# Patient Record
(demographics unavailable — no encounter records)

---

## 2017-01-10 NOTE — EDDOCDS
Nurse's Notes                                                                                     

[facility]                                                                         

Name: Madyson Snider                                                                                 

Age: 54 yrs                                                                                       

Sex: Female                                                                                       

: 1962                                                                                   

MRN: [id number]                                                                                     

Arrival Date: 01/10/2017                                                                          

Time: 14:44                                                                                       

Account#: [id number]                                                                              

Bed I2 / M2                                                                                       

Private MD: No Pcp                                                                                

Diagnosis: Periapical abscess without sinus-phlegmon left mandible, widespread dental caries      

                                                                                                  

Presentation:                                                                                     

01/10                                                                                             

14:48 Presenting complaint: Patient states: dental infection and has been ongoing for 2       hs1 

      weeks. Pt at [facility] for teeth removal and was sent here because        

      they stated her blood pressure was too high. Patient states she was also sent here for      

      antibiotics. Adult Sepsis Screening: The patient does not have new or worsening altered     

      mentation. Patient's respiratory rate is less than 22. Systolic blood pressure is           

      greater than 100. Patient has a qSOFA score of 0- Negative Sepsis Screen.                   

      Suicide/Homicide risk assessment- the patient denies having any suicidal and/or             

      homicidal ideations and does not present with any other emotional, behavioral or mental     

      health complaints.  Status: Patient is not a  or              

      dependent. Transition of care: patient was received from Dental Office - [facility] .                                                                      

14:48 Acuity: PETER Level 3                                                                     hs1 

14:48 Method Of Arrival: Walkin/Carried/Asstd                                                 hs1 

                                                                                                  

Triage Assessment:                                                                                

14:52 General: Appears in no apparent distress, Behavior is appropriate for age, cooperative. hs1 

      Pain: Location: mouth Pain currently is 10 out of 10 on a pain scale. Quality of pain       

      is described as throbbing. Respiratory: Airway is patent Respiratory effort is even,        

      unlabored.                                                                                  

14:53 Pt Declines HIV testing.                                                                hs1 

                                                                                                  

Historical:                                                                                       

- Allergies: No known drug Allergies;                                                             

- Home Meds:                                                                                      

1. lisinopril 10 mg Oral tab 1 tab once daily                                                   

     (Last dose: 2017)                                                                      

2. atenolol 25 mg Oral tab 1 tab once daily                                                     

     (Last dose: 2017)                                                                      

- PMHx: Hypertension;                                                                             

- PSHx: none;                                                                                     

- Social history: Smoking status: Patient states was never smoker of tobacco. No                  

barriers to communication noted, The patient speaks fluent English, Speaks                      

appropriately for age.                                                                          

- Family history: Not pertinent.                                                                  

- : The pt / caregiver states he / she is not on anticoagulants. Home medication list             

is obtained from the patient.                                                                   

- Exposure Risk Screening:: None identified.                                                      

                                                                                                  

                                                                                                  

Screenin:54 Screening information is obtained from the patient. Fall risk: No risks identified.     hs1 

      Assistance ADL's: requires no assistance with activities of daily living. Abuse/DV          

      Screen: The patient / caregiver reports he/she is: not in a situation that causes fear,     

      pain or injury. Nutritional screening: No deficits noted. Advance Directives: There is      

      no active DNR order. home support is adequate.                                              

                                                                                                  

Assessment:                                                                                       

15:32 General: Appears in no apparent distress, comfortable, Behavior is appropriate for age, jmb 

      cooperative. Pain: Location: mouth Pain currently is 10 out of 10 on a pain scale.          

      Neurological: Level of Consciousness is awake, alert, obeys commands, Oriented to           

      person, place, time,  are equal bilaterally Speech is normal, Facial symmetry          

      appears normal. Cardiovascular: Capillary refill < 3 seconds Heart tones present Pulses     

      are all present. Respiratory: Airway is patent Respiratory effort is even, unlabored,       

      Respiratory pattern is regular, symmetrical, Breath sounds are clear bilaterally. GI:       

      Abdomen is non- distended Bowel sounds present X 4 quads. Abd is soft X 4 quads. Derm:      

      Skin is normal. Musculoskeletal: Range of motion intact in all extremities.                 

16:20 General: Appears in no apparent distress, Behavior is cooperative. Neurological: No     mcp 

      deficits noted. Respiratory: Airway is patent Respiratory effort is even, unlabored.        

      Derm: Skin is pink, warm & dry.                                                           

                                                                                                  

Vital Signs:                                                                                      

14:45  / 114; Pulse 120; Resp 18 S; Temp 97.3(O); Pulse Ox 95% on R/A; Weight 81.65 kg  dd6 

      (R); Height 5 ft. 4 in. (162.56 cm) (R);                                                    

16:00 Pain 4/10;                                                                              mcp 

16:26  / 98; Pulse 98; Resp 18; Temp 98.9; Pulse Ox 98% ; Pain 5/10;                    jam1

14:45 Body Mass Index 30.90 (81.65 kg, 162.56 cm)                                             dd6 

                                                                                                  

Vitals:                                                                                           

14:45 Log In Time: January 10, 2017 at 14:43.                                                 dd6 

                                                                                                  

ED Course:                                                                                        

14:45 Patient visited by Tristan Ga PCA.                                             dd6 

14:45 No Pcp is Private Physician.                                                            dd6 

14:45 Patient moved to Waiting                                                                dd6 

14:46 Patient moved to Pre RCE                                                                dd6 

14:50 Triage Initiated                                                                        hs1 

14:54 Patient moved to Triage 3                                                               hs1 

14:55 Brenton Singh PA-C is PHCP.                                                        ar2 

14:55 Katya Figueroa MD is Attending Physician.                                      ar2 

14:55 Patient visited by Brenton Singh PA-C.                                             ar2 

15:04 Patient moved to I2 / M2                                                                ms18

15:16 CBC with Diff Sent.                                                                     jmb 

15:16 MED Profile Sent.                                                                       jmb 

15:16 Sed Rate Sent.                                                                          jmb 

15:16 CRP Sent.                                                                               jmb 

15:32 The patient / caregiver is instructed regarding the plan of care and ED course.         jmb 

15:32 Inserted saline lock: 20 gauge in right antecubital area and blood collected. The       jmb 

      patient tolerated the procedure well. Labs drawn. (by ED staff). Sent per order to lab.     

15:33 Patient visited by Mikel Bennett RN.                                                    jmb 

15:45 [facility] Payment Agreement was scanned into [employer] and attached to record.               zo  

16:25 Eric Buck MD is Referral Physician.                                                 ar2 

16:42 Discontinued lock intact, bleeding controlled, pressure dressing applied, No            mcp 

      redness/swelling at site. No procedures done that require assistance.                       

                                                                                                  

Administered Medications:                                                                         

15:31 Drug: Ampicillin-Sulbactam Sodium 3 grams [ampicillin-sulbactam 1.5 gram solution for   jmb 

      injection] Route: IVPB; Infused Over: 30 mins; Site: right antecubital;                     

16:30 Follow up: IV Status: Completed infusion; IV Intake: 100ml                              mcp 

15:31 Drug: NS 0.9% 1000 ml [sodium chloride 0.9 % intravenous solution] Route: IV; Rate:     jmb 

      bolus; Site: right antecubital;                                                             

16:39 Follow up: IV Status: Infusion discontinued; IV Intake: 900ml                           mcp 

15:31 Drug: Lisinopril 10 mg [lisinopril 10 mg tablet (1 tabs)] Route: PO;                    jmb 

15:31 Drug: Atenolol 25 mg [atenolol 25 mg tablet (1 tabs)] Route: PO;                        jmb 

15:31 Drug: morphine 4 mg [morphine 4 mg/mL intravenous cartridge (1 mL)] Route: IVP; Site:   jmb 

      right antecubital;                                                                          

16:00 Follow up: Pain 4/10 Adult; Response: Pain is decreased                                 mcp 

15:31 Drug: Ondansetron 4 mg [ondansetron HCl 2 mg/mL intravenous solution (2 mL)] Route:     jmb 

      IVP; Site: right antecubital;                                                               

                                                                                                  

                                                                                                  

Intake:                                                                                           

16:30 IV: 100.00ml; Total: 100.00ml.                                                          mcp 

16:39 IV: 900.00ml; Total: 1000.00ml.                                                         mcp 

                                                                                                  

Order Results:                                                                                    

Lab Order: CBC with Diff; SPEC'M 01/10/17 15:14                                                   

      Test: WHITE BLOOD COUNT; Value: 11.1; Range: 4.0-10.0; Abnormal: Above high normal;         

      Units: K/mm3; Status: F                                                                     

      Test: RED BLOOD COUNT; Value: 4.51; Range: 4.00-5.40; Units: M/mm3; Status: F               

      Test: HEMOGLOBIN; Value: 14.5; Range: 12.0-16.0; Units: g/dl; Status: F                     

      Test: HEMATOCRIT; Value: 42.9; Range: 36.0-47.0; Units: %; Status: F                        

      Test: MEAN CORPUSCULAR VOLUME; Value: 95.1; Range: 80.0-96.0; Units: fl; Status: F          

      Test: MEAN CORPUSCULAR HEMOGLOBIN; Value: 32.2; Range: 27.0-33.0; Units: pg; Status: F      

      Test: MEAN CORPUSCULAR HGB CONC; Value: 33.9; Range: 32.0-36.5; Units: g/dl; Status: F      

      Test: RED CELL DISTRIBUTION WIDTH; Value: 12.9; Range: 11.5-14.5; Units: %; Status: F       

      Test: PLATELET COUNT, AUTOMATED; Value: 317; Range: 150-450; Units: k/mm3; Status: F        

      Test: NEUTROPHILS %; Value: 83.8; Range: 36.0-66.0; Abnormal: Above high normal; Units:     

      %; Status: F                                                                                

      Test: LYMPH %; Value: 12.1; Range: 24.0-44.0; Abnormal: Below low normal; Units: %;         

      Status: F                                                                                   

      Test: MONO %; Value: 1.5; Range: 0.0-5.0; Units: %; Status: F                               

      Test: EOS %; Value: 1.4; Range: 0.0-3.0; Units: %; Status: F                                

      Test: BASO %; Value: 0.2; Range: 0.0-1.0; Units: %; Status: F                               

      Test: LARGE UNSTAINED CELL %; Value: 0.9; Range: 0.0-4.0; Units: %; Status: F               

      Test: NEUTROPHILS #; Value: 9.3; Range: 1.8-7.7; Abnormal: Above high normal; Units:        

      K/mm3; Status: F                                                                            

      Test: LYMPH #; Value: 1.3; Range: 1.5-4.5; Abnormal: Below low normal; Units: K/mm3;        

      Status: F                                                                                   

      Test: MONO #; Value: 0.2; Range: 0.0-0.8; Units: K/mm3; Status: F                           

      Test: EOS #; Value: 0.2; Range: 0.0-0.50; Units: K/mm3; Status: F                           

      Test: BASO #; Value: 0.0; Range: 0.0-0.2; Units: K/mm3; Status: F                           

      Test: LARGE UNSTAINED CELL #; Value: 0.1; Range: 0.0-0.4; Units: K/mm3; Status: F           

Lab Order: MED Profile; SPEC'M 01/10/17 15:14                                                     

      Test: GLUCOSE, FASTING; Value: 125; Range: ; Abnormal: Above high normal; Units:      

      MG/DL; Status: F                                                                            

      Test: BLOOD UREA NITROGEN; Value: 8; Range: 7-18; Units: MG/DL; Status: F                   

      Test: CREATININE FOR GFR; Value: 0.84; Range: 0.55-1.02; Units: MG/DL; Status: F            

      Test: GLOMERULAR FILTRATION RATE; Value: > 60.0; Range: >51; Status: F                      

      Test: SODIUM LEVEL; Value: 139; Range: 136-145; Units: MEQ/L; Status: F                     

      Test: POTASSIUM SERUM; Value: 3.5; Range: 3.5-5.1; Units: MEQ/L; Status: F                  

      Test: CHLORIDE LEVEL; Value: 99; Range: ; Units: MEQ/L; Status: F                     

      Test: CARBON DIOXIDE LEVEL; Value: 27; Range: 21-32; Units: MEQ/L; Status: F                

      Test: ANION GAP; Value: 13; Range: 8-16; Units: MEQ/L; Status: F                            

      Test: CALCIUM LEVEL; Value: 9.9; Range: 8.5-10.1; Units: MG/DL; Status: F                   

      Test Note: &nbsp;; Units are mL/min/1.73 m2 Chronic Kidney Disease Staging per NKF:       

      Stage I & II GFR >=60 Normal to Mildly Decreased Stage III GFR 30-59 Moderately           

      Decreased Stage IV GFR 15-29 Severely Decreased Stage V GFR <15 Very Little GFR Left        

      ESRD GFR <15 on RRT                                                                         

Lab Order: Sed Rate; SPEC'M 01/10/17 15:14                                                        

      Test: ERYTHROCYTE SEDIMENTATION RATE; Value: 26; Range: 0-30; Units: mm/hr; Status: F       

Lab Order: CRP; SPEC'M 01/10/17 15:14                                                             

      Test: C REACTIVE PROTEIN QUANTITATIV; Value: 1.44; Range: 0.00-0.30; Abnormal: Above        

      high normal; Units: MG/DL; Status: F                                                        

                                                                                                  

Outcome:                                                                                          

16:26 Discharge ordered by Provider.                                                          ar2 

16:43 Discharge Assessment: patient administered narcotics - yes. Pt provided with safe       mcp 

      discharge. The following High Risk Discharge criteria are identified: None. Discharged      

      to home ambulatory. Condition: stable. Discharge instructions given to patient,             

      Instructed on discharge instructions, follow up and referral plans. medication usage,       

      Demonstrated understanding of instructions, medications, Pt was receptive of discharge      

      instructions/ teaching. Prescriptions given X 4. CT Study completed. Property sent home     

      with patient.                                                                               

16:44 Patient left the ED.                                                                    [facility] 

                                                                                                  

Signatures:                                                                                       

Yamini Kruger RN RN mcp Murphy, Jane, PCA                       PCA  jam1                                                 

Yon Hernandez Aaron, PA-C                 PAERIC ar2                                                  

Tristan Ga, PCA                 PCA  dd6                                                  

Lora Mcdowell RN                    RN   hs1                                                  

Mikel Bennett RN                        RN   Sondra DaughertyRN                        RN   ms18                                                 

                                                                                                  

**************************************************************************************************

MTDD

## 2017-01-10 NOTE — REP
CT NECK WITH CONTRAST:

 

HISTORY: Rule out abscess.

 

There is soft-tissue thickening along the buccal surface of the body and  of the

left mandible  and symphysis . This represents a phlegmon. There is thickening of

the left platysmal muscle and stranding in the overlying subcutaneous tissue

consistent with edema. The naso-, fatimah,- and hypopharynx, larynx and subglottic

trachea are normal in appearance. The salivary and thyroid glands are normal.

Small lymph nodes less than 1 cm in size are present in the internal jugular

chains, posterior triangles, submandibular and submental areas. Minimal

degenerative change is present in the cervical spine. The lung apices are clear.

A punctate calcification is present in the sella turcica. Periapical lucency is

present involving the second    molar     tooth of the left mandible. There are

possible dental caries involving the first premolar and left second molar teeth

of the left mandible and second premolar tooth of the right mandible.

 

IMPRESSION:

 

There is soft tissue thickening along the buccal surface of the body of the left

mandible and mandibular symphysis consistent with a phlegmon.

 

 

Signed by

Emeka James MD 01/11/2017 08:45 A

## 2017-01-10 NOTE — EDDOCDS
Nurse's Notes                                                                                     

[facility]                                                                         

Name: Madyson Snider                                                                                 

Age: 54 yrs                                                                                       

Sex: Female                                                                                       

: 1962                                                                                   

MRN: [id number]                                                                                     

Arrival Date: 01/10/2017                                                                          

Time: 14:44                                                                                       

Account#: [id number]                                                                              

Bed PR                                                                                      

Private MD: No Pcp                                                                                

Diagnosis: Periapical abscess without sinus-phlegmon left mandible, widespread dental caries      

                                                                                                  

Presentation:                                                                                     

01/10                                                                                             

14:48 Presenting complaint: Patient states: dental infection and has been ongoing for 2       hs1 

      weeks. Pt at [facility] for teeth removal and was sent here because        

      they stated her blood pressure was too high. Patient states she was also sent here for      

      antibiotics. Adult Sepsis Screening: The patient does not have new or worsening altered     

      mentation. Patient's respiratory rate is less than 22. Systolic blood pressure is           

      greater than 100. Patient has a qSOFA score of 0- Negative Sepsis Screen.                   

      Suicide/Homicide risk assessment- the patient denies having any suicidal and/or             

      homicidal ideations and does not present with any other emotional, behavioral or mental     

      health complaints.  Status: Patient is not a  or              

      dependent. Transition of care: patient was received from [facility] .                                                                      

14:48 Acuity: PETER Level 3                                                                     hs1 

14:48 Method Of Arrival: Walkin/Carried/Asstd                                                 hs1 

                                                                                                  

Triage Assessment:                                                                                

14:52 General: Appears in no apparent distress, Behavior is appropriate for age, cooperative. hs1 

      Pain: Location: mouth Pain currently is 10 out of 10 on a pain scale. Quality of pain       

      is described as throbbing. Respiratory: Airway is patent Respiratory effort is even,        

      unlabored.                                                                                  

14:53 Pt Declines HIV testing.                                                                hs1 

                                                                                                  

Historical:                                                                                       

- Allergies: No known drug Allergies;                                                             

- Home Meds:                                                                                      

1. lisinopril 10 mg Oral tab 1 tab once daily                                                   

     (Last dose: 2017)                                                                      

2. atenolol 25 mg Oral tab 1 tab once daily                                                     

     (Last dose: 2017)                                                                      

- PMHx: Hypertension;                                                                             

- PSHx: none;                                                                                     

- Social history: Smoking status: Patient states was never smoker of tobacco. No                  

barriers to communication noted, The patient speaks fluent English, Speaks                      

appropriately for age.                                                                          

- Family history: Not pertinent.                                                                  

- : The pt / caregiver states he / she is not on anticoagulants. Home medication list             

is obtained from the patient.                                                                   

- Exposure Risk Screening:: None identified.                                                      

                                                                                                  

                                                                                                  

Screenin:54 Screening information is obtained from the patient. Fall risk: No risks identified.     hs1 

      Assistance ADL's: requires no assistance with activities of daily living. Abuse/DV          

      Screen: The patient / caregiver reports he/she is: not in a situation that causes fear,     

      pain or injury. Nutritional screening: No deficits noted. Advance Directives: There is      

      no active DNR order. home support is adequate.                                              

                                                                                                  

Assessment:                                                                                       

15:32 General: Appears in no apparent distress, comfortable, Behavior is appropriate for age, jmb 

      cooperative. Pain: Location: mouth Pain currently is 10 out of 10 on a pain scale.          

      Neurological: Level of Consciousness is awake, alert, obeys commands, Oriented to           

      person, place, time,  are equal bilaterally Speech is normal, Facial symmetry          

      appears normal. Cardiovascular: Capillary refill < 3 seconds Heart tones present Pulses     

      are all present. Respiratory: Airway is patent Respiratory effort is even, unlabored,       

      Respiratory pattern is regular, symmetrical, Breath sounds are clear bilaterally. GI:       

      Abdomen is non- distended Bowel sounds present X 4 quads. Abd is soft X 4 quads. Derm:      

      Skin is normal. Musculoskeletal: Range of motion intact in all extremities.                 

16:20 General: Appears in no apparent distress, Behavior is cooperative. Neurological: No     mcp 

      deficits noted. Respiratory: Airway is patent Respiratory effort is even, unlabored.        

      Derm: Skin is pink, warm & dry.                                                           

                                                                                                  

Vital Signs:                                                                                      

14:45  / 114; Pulse 120; Resp 18 S; Temp 97.3(O); Pulse Ox 95% on R/A; Weight 81.65 kg  dd6 

      (R); Height 5 ft. 4 in. (162.56 cm) (R);                                                    

16:00 Pain 4/10;                                                                              mcp 

16:26  / 98; Pulse 98; Resp 18; Temp 98.9; Pulse Ox 98% ; Pain 5/10;                    jam1

14:45 Body Mass Index 30.90 (81.65 kg, 162.56 cm)                                             dd6 

                                                                                                  

Vitals:                                                                                           

14:45 Log In Time: January 10, 2017 at 14:43.                                                 dd6 

                                                                                                  

ED Course:                                                                                        

14:45 Patient visited by Tristan Ga PCA.                                             dd6 

14:45 No Pcp is Private Physician.                                                            dd6 

14:45 Patient moved to Waiting                                                                dd6 

14:46 Patient moved to Pre RCE                                                                dd6 

14:50 Triage Initiated                                                                        hs1 

14:54 Patient moved to Triage 3                                                               hs1 

14:55 Brenton Singh PA-C is PHCP.                                                        ar2 

14:55 Katya Figueroa MD is Attending Physician.                                      ar2 

14:55 Patient visited by Brenton Singh PA-C.                                             ar2 

15:04 Patient moved to I2 / M2                                                                ms18

15:16 CBC with Diff Sent.                                                                     jmb 

15:16 MED Profile Sent.                                                                       jmb 

15:16 Sed Rate Sent.                                                                          jmb 

15:16 CRP Sent.                                                                               jmb 

15:32 The patient / caregiver is instructed regarding the plan of care and ED course.         jmb 

15:32 Inserted saline lock: 20 gauge in right antecubital area and blood collected. The       jmb 

      patient tolerated the procedure well. Labs drawn. (by ED staff). Sent per order to lab.     

15:33 Patient visited by Mikel Bennett RN.                                                    jmb 

15:45 [facility] Payment Agreement was scanned into [employer] and attached to record.               zo  

16:25 Eric Buck MD is Referral Physician.                                                 ar2 

16:42 Discontinued lock intact, bleeding controlled, pressure dressing applied, No            mcp 

      redness/swelling at site. No procedures done that require assistance.                       

18:07 Patient moved to PR1 / 25                                                               ms18

18:19 CT Neck With Contrast Returned.                                                         EDMS

                                                                                                  

Administered Medications:                                                                         

15:31 Drug: Ampicillin-Sulbactam Sodium 3 grams [ampicillin-sulbactam 1.5 gram solution for   jmb 

      injection] Route: IVPB; Infused Over: 30 mins; Site: right antecubital;                     

16:30 Follow up: IV Status: Completed infusion; IV Intake: 100ml                              mcp 

15:31 Drug: NS 0.9% 1000 ml [sodium chloride 0.9 % intravenous solution] Route: IV; Rate:     jmb 

      bolus; Site: right antecubital;                                                             

16:39 Follow up: IV Status: Infusion discontinued; IV Intake: 900ml                           mcp 

15:31 Drug: Lisinopril 10 mg [lisinopril 10 mg tablet (1 tabs)] Route: PO;                    jmb 

15:31 Drug: Atenolol 25 mg [atenolol 25 mg tablet (1 tabs)] Route: PO;                        jmb 

15:31 Drug: morphine 4 mg [morphine 4 mg/mL intravenous cartridge (1 mL)] Route: IVP; Site:   [facility] 

      right antecubital;                                                                          

16:00 Follow up: Pain 4/10 Adult; Response: Pain is decreased                                 mcp 

15:31 Drug: Ondansetron 4 mg [ondansetron HCl 2 mg/mL intravenous solution (2 mL)] Route:     jmb 

      IVP; Site: right antecubital;                                                               

18:21 Drug: HYDROcodone-acetaminophen 4 pack- 1 packets [hydrocodone 5 mg-acetaminophen 325   ms18

      mg tablet (1 tabs)] {Co-Signature: dwg (Faizan Caceres RN).} Route: PO;                      

                                                                                                  

                                                                                                  

Intake:                                                                                           

16:30 IV: 100.00ml; Total: 100.00ml.                                                          mcp 

16:39 IV: 900.00ml; Total: 1000.00ml.                                                         mcp 

                                                                                                  

Order Results:                                                                                    

Lab Order: CBC with Diff; SPEC'M 01/10/17 15:14                                                   

      Test: WHITE BLOOD COUNT; Value: 11.1; Range: 4.0-10.0; Abnormal: Above high normal;         

      Units: K/mm3; Status: F                                                                     

      Test: RED BLOOD COUNT; Value: 4.51; Range: 4.00-5.40; Units: M/mm3; Status: F               

      Test: HEMOGLOBIN; Value: 14.5; Range: 12.0-16.0; Units: g/dl; Status: F                     

      Test: HEMATOCRIT; Value: 42.9; Range: 36.0-47.0; Units: %; Status: F                        

      Test: MEAN CORPUSCULAR VOLUME; Value: 95.1; Range: 80.0-96.0; Units: fl; Status: F          

      Test: MEAN CORPUSCULAR HEMOGLOBIN; Value: 32.2; Range: 27.0-33.0; Units: pg; Status: F      

      Test: MEAN CORPUSCULAR HGB CONC; Value: 33.9; Range: 32.0-36.5; Units: g/dl; Status: F      

      Test: RED CELL DISTRIBUTION WIDTH; Value: 12.9; Range: 11.5-14.5; Units: %; Status: F       

      Test: PLATELET COUNT, AUTOMATED; Value: 317; Range: 150-450; Units: k/mm3; Status: F        

      Test: NEUTROPHILS %; Value: 83.8; Range: 36.0-66.0; Abnormal: Above high normal; Units:     

      %; Status: F                                                                                

      Test: LYMPH %; Value: 12.1; Range: 24.0-44.0; Abnormal: Below low normal; Units: %;         

      Status: F                                                                                   

      Test: MONO %; Value: 1.5; Range: 0.0-5.0; Units: %; Status: F                               

      Test: EOS %; Value: 1.4; Range: 0.0-3.0; Units: %; Status: F                                

      Test: BASO %; Value: 0.2; Range: 0.0-1.0; Units: %; Status: F                               

      Test: LARGE UNSTAINED CELL %; Value: 0.9; Range: 0.0-4.0; Units: %; Status: F               

      Test: NEUTROPHILS #; Value: 9.3; Range: 1.8-7.7; Abnormal: Above high normal; Units:        

      K/mm3; Status: F                                                                            

      Test: LYMPH #; Value: 1.3; Range: 1.5-4.5; Abnormal: Below low normal; Units: K/mm3;        

      Status: F                                                                                   

      Test: MONO #; Value: 0.2; Range: 0.0-0.8; Units: K/mm3; Status: F                           

      Test: EOS #; Value: 0.2; Range: 0.0-0.50; Units: K/mm3; Status: F                           

      Test: BASO #; Value: 0.0; Range: 0.0-0.2; Units: K/mm3; Status: F                           

      Test: LARGE UNSTAINED CELL #; Value: 0.1; Range: 0.0-0.4; Units: K/mm3; Status: F           

Lab Order: MED Profile; SPEC'M 01/10/17 15:14                                                     

      Test: GLUCOSE, FASTING; Value: 125; Range: ; Abnormal: Above high normal; Units:      

      MG/DL; Status: F                                                                            

      Test: BLOOD UREA NITROGEN; Value: 8; Range: 7-18; Units: MG/DL; Status: F                   

      Test: CREATININE FOR GFR; Value: 0.84; Range: 0.55-1.02; Units: MG/DL; Status: F            

      Test: GLOMERULAR FILTRATION RATE; Value: > 60.0; Range: >51; Status: F                      

      Test: SODIUM LEVEL; Value: 139; Range: 136-145; Units: MEQ/L; Status: F                     

      Test: POTASSIUM SERUM; Value: 3.5; Range: 3.5-5.1; Units: MEQ/L; Status: F                  

      Test: CHLORIDE LEVEL; Value: 99; Range: ; Units: MEQ/L; Status: F                     

      Test: CARBON DIOXIDE LEVEL; Value: 27; Range: 21-32; Units: MEQ/L; Status: F                

      Test: ANION GAP; Value: 13; Range: 8-16; Units: MEQ/L; Status: F                            

      Test: CALCIUM LEVEL; Value: 9.9; Range: 8.5-10.1; Units: MG/DL; Status: F                   

      Test Note: &nbsp;; Units are mL/min/1.73 m2 Chronic Kidney Disease Staging per NKF:       

      Stage I & II GFR >=60 Normal to Mildly Decreased Stage III GFR 30-59 Moderately           

      Decreased Stage IV GFR 15-29 Severely Decreased Stage V GFR <15 Very Little GFR Left        

      ESRD GFR <15 on RRT                                                                         

Lab Order: Sed Rate; SPEC'M 01/10/17 15:14                                                        

      Test: ERYTHROCYTE SEDIMENTATION RATE; Value: 26; Range: 0-30; Units: mm/hr; Status: F       

Lab Order: CRP; SPEC'M 01/10/17 15:14                                                             

      Test: C REACTIVE PROTEIN QUANTITATIV; Value: 1.44; Range: 0.00-0.30; Abnormal: Above        

      high normal; Units: MG/DL; Status: F                                                        

                                                                                                  

Radiology Order: CT Neck With Contrast                                                            

      Test: CT Neck With Contrast                                                                 

      REASON FOR EXAMINATION: r/o abscess left mandibular; CT NECK WITH CONTRAST:; ; HISTORY:     

      Rule out abscess.; ; There is soft-tissue thickening along the buccal surface of the        

      body and; symphysis of the left mandible. This represents a phlegmon. There is              

      thickening; of the left platysmal muscle and stranding in the overlying subcutaneous        

      tissue; consistent with edema. The naso-, fatimah,- and hypopharynx, larynx and subglottic;     

      trachea are normal in appearance. The salivary and thyroid glands are normal.; Small        

      lymph nodes less than 1 cm in size are present in the internal jugular; chains,             

      posterior triangles, submandibular and submental areas. Minimal; degenerative change is     

      present in the cervical spine. The lung apices are clear.; A punctate calcification is      

      present in the sella turcica. Periapical lucency is; present involving the second volar     

      tooth of the left mandible. There are possible; dental caries involving the first           

      premolar and left second molar tooth of the; left mandible and second premolar tooth of     

      the right mandible.; ; IMPRESSION:; There is soft tissue thickening along the buccal        

      surface of the body of the left; mandible and mandibular symphysis consistent with a        

      phlegmon.; ; Unreviewed;                                                                    

Outcome:                                                                                          

16:26 Discharge ordered by Provider.                                                          ar2 

16:43 Discharge Assessment: patient administered narcotics - yes. Pt provided with safe       [facility] 

      discharge. The following High Risk Discharge criteria are identified: None. Discharged      

      to home ambulatory. Condition: stable. Discharge instructions given to patient,             

      Instructed on discharge instructions, follow up and referral plans. medication usage,       

      Demonstrated understanding of instructions, medications, Pt was receptive of discharge      

      instructions/ teaching. Prescriptions given X 4. CT Study completed. Property sent home     

      with patient.                                                                               

16:44 Patient left the ED.                                                                    [facility] 

18:23 Patient left the ED.                                                                    ms18

                                                                                                  

Signatures:                                                                                       

Dispatcher [employer]                           EDMS                                                 

Yamini Kruger RN                        RN   mcp                                                  

Shazia Bustillos, PCA                       PCA  jam1                                                 

Yon Hernandez Aaron, PA-C                 PAERIC ar2                                                  

Tristan Ga, PCA                 PCA  dd6                                                  

Lora Mcdowell RN                    RN   hs1                                                  

Mikel Bennett RN                        RN   Sondra Daugherty RN                        RN   ms18                                                 

Faizan isaac                                                  

                                                                                                  

**************************************************************************************************

MTDD

## 2017-01-10 NOTE — EDDOCDS
Physician Documentation                                                                           

[facility]                                                                         

Name: Madyson Sinder                                                                                 

Age: 54 yrs                                                                                       

Sex: Female                                                                                       

: 1962                                                                                   

MRN: [id number]                                                                                     

Arrival Date: 01/10/2017                                                                          

Time: 14:44                                                                                       

Account#: [id number]                                                                              

Bed I2 / M2                                                                                       

Private MD: No Pcp                                                                                

Disposition:                                                                                      

01/10/17 16:26 Discharged to Home/Self Care. Impression: Periapical abscess without sinus -       

phlegmon left mandible, widespread dental caries.                                               

- Condition is Stable.                                                                            

- Discharge Instructions: Dental Abscess, Dental Pain.                                            

- Prescriptions for Augmentin 875- 125 mg Oral Tablet - take 1 tablet by ORAL route               

every 12 hours for 10 days; 20 tablet. Ibuprofen 800 mg Oral Tablet - take 1 tablet             

by ORAL route every 8 hours As needed take with food; 30 tablet. Tylenol- Codeine #3            

300-30 mg Oral Tablet - take 2 tablet by ORAL route every 6 hours As needed MDD: 4              

tabs; 30 tablet. ZOFRAN ODT 4 mg - dissolve 1 tablet by ORAL route 4 times per day As           

needed do not chew, do not swallow whole; 10 tablet.                                            

- Medication Reconciliation, Local Pharmacy Hours form.                                           

- Follow up: Eric Buck MD; When: Call to arrange an appointment; Reason: Recheck              

today's complaints. Follow up: Emergency Department; When: As needed; Reason:                   

Worsening of conditions.                                                                        

- Problem is new.                                                                                 

- Symptoms have improved.                                                                         

                                                                                                  

                                                                                                  

                                                                                                  

Historical:                                                                                       

- Allergies: No known drug Allergies;                                                             

- Home Meds:                                                                                      

1. lisinopril 10 mg Oral tab 1 tab once daily                                                   

     (Last dose: 2017)                                                                      

2. atenolol 25 mg Oral tab 1 tab once daily                                                     

     (Last dose: 2017)                                                                      

- PMHx: Hypertension;                                                                             

- PSHx: none;                                                                                     

- Social history: Smoking status: Patient states was never smoker of tobacco. No                  

barriers to communication noted, The patient speaks fluent English, Speaks                      

appropriately for age.                                                                          

- Family history: Not pertinent.                                                                  

- : The pt / caregiver states he / she is not on anticoagulants. Home medication list             

is obtained from the patient.                                                                   

- Exposure Risk Screening:: None identified.                                                      

                                                                                                  

                                                                                                  

Vital Signs:                                                                                      

01/10                                                                                             

14:45  / 114; Pulse 120; Resp 18 S; Temp 97.3(O); Pulse Ox 95% on R/A; Weight 81.65 kg  dd6 

      / 180.01 lbs (R); Height 5 ft. 4 in. (162.56 cm) (R);                                       

16:00 Pain 4/10;                                                                              mcp 

16:26  / 98; Pulse 98; Resp 18; Temp 98.9; Pulse Ox 98% ; Pain 5/10;                    jam1

14:45 Body Mass Index 30.90 (81.65 kg, 162.56 cm)                                             dd6 

                                                                                                  

MDM:                                                                                              

15:02 IV Saline Lock ordered.                                                                 ar2 

15:02 Ampicillin-Sulbactam Sodium 3 grams IVPB once over 30 mins; dilute in 100mL of NS or    ar2 

      D5W ordered.                                                                                

15:02 NS 0.9% 1000 ml IV at bolus once ordered.                                               ar2 

15:02 Lisinopril 10 mg PO once ordered.                                                       ar2 

15:02 Atenolol 25 mg PO once ordered.                                                         ar2 

15:04 CBC with Diff Ordered.                                                                  EDMS

15:04 MED Profile Ordered.                                                                    EDMS

15:04 Sed Rate Ordered.                                                                       EDMS

15:04 CRP Ordered.                                                                            EDMS

15:06 morphine 4 mg IVP once ordered.                                                         ar2 

15:29 Ondansetron 4 mg IVP once ordered.                                                      ar2 

15:42 Financial registration complete.                                                        zo  

15:45 [facility] Payment Agreement was scanned into [employer] and attached to record.               zo  

15:48 CBC with Diff Reviewed.                                                                 ar2 

15:48 MED Profile Reviewed.                                                                   ar2 

15:48 CRP Reviewed.                                                                           ar2 

15:49 Vital Signs ordered.                                                                    ar2 

15:49 CT Neck With Contrast Ordered.                                                          EDMS

                                                                                                  

Administered Medications:                                                                         

15:31 Drug: Ampicillin-Sulbactam Sodium 3 grams [ampicillin-sulbactam 1.5 gram solution for   jmb 

      injection] Route: IVPB; Infused Over: 30 mins; Site: right antecubital;                     

16:30 Follow up: IV Status: Completed infusion; IV Intake: 100ml                              mcp 

15:31 Drug: NS 0.9% 1000 ml [sodium chloride 0.9 % intravenous solution] Route: IV; Rate:     jmb 

      bolus; Site: right antecubital;                                                             

16:39 Follow up: IV Status: Infusion discontinued; IV Intake: 900ml                           mcp 

15:31 Drug: Lisinopril 10 mg [lisinopril 10 mg tablet (1 tabs)] Route: PO;                    jmb 

15:31 Drug: Atenolol 25 mg [atenolol 25 mg tablet (1 tabs)] Route: PO;                        jmb 

15:31 Drug: morphine 4 mg [morphine 4 mg/mL intravenous cartridge (1 mL)] Route: IVP; Site:   b 

      right antecubital;                                                                          

16:00 Follow up: Pain 4/10 Adult; Response: Pain is decreased                                 [facility] 

15:31 Drug: Ondansetron 4 mg [ondansetron HCl 2 mg/mL intravenous solution (2 mL)] Route:     jmb 

      IVP; Site: right antecubital;                                                               

                                                                                                  

                                                                                                  

Signatures:                                                                                       

Dispatcher [employer]                           Yamini Dalal RN RN   [facility]                                                  

Yon Hernandez Aaron, PA-C                 [facility] ar2                                                  

Lora Mcdowell RN RN   hs1                                                  

Mikel Bennett RN                                                  

                                                                                                  

The chart was reviewed and I authenticate all verbal orders and agree with the evaluation and 
treatment provided.Attachments:

15:45 [facility] Payment Agreement                                                                zo  

                                                                                                  

**************************************************************************************************

MTDD

## 2017-01-10 NOTE — EDDOCDS
Physician Documentation                                                                           

[facility]                                                                         

Name: Madyson Snider                                                                                 

Age: 54 yrs                                                                                       

Sex: Female                                                                                       

: 1962                                                                                   

MRN: [id number]                                                                                     

Arrival Date: 01/10/2017                                                                          

Time: 14:44                                                                                       

Account#: [id number]                                                                              

Bed PR                                                                                      

Private MD: No Pcp                                                                                

Disposition:                                                                                      

01/10/17 16:26 Discharged to Home/Self Care. Impression: Periapical abscess without sinus -       

phlegmon left mandible, widespread dental caries.                                               

- Condition is Stable.                                                                            

- Discharge Instructions: Dental Abscess, Dental Pain.                                            

- Prescriptions for Augmentin 875- 125 mg Oral Tablet - take 1 tablet by ORAL route               

every 12 hours for 10 days; 20 tablet. Ibuprofen 800 mg Oral Tablet - take 1 tablet             

by ORAL route every 8 hours As needed take with food; 30 tablet. Tylenol- Codeine #3            

300-30 mg Oral Tablet - take 2 tablet by ORAL route every 6 hours As needed MDD: 4              

tabs; 30 tablet. ZOFRAN ODT 4 mg - dissolve 1 tablet by ORAL route 4 times per day As           

needed do not chew, do not swallow whole; 10 tablet.                                            

- Medication Reconciliation, Local Pharmacy Hours form.                                           

- Follow up: Eric Buck MD; When: Call to arrange an appointment; Reason: Recheck              

today's complaints. Follow up: Emergency Department; When: As needed; Reason:                   

Worsening of conditions.                                                                        

- Problem is new.                                                                                 

- Symptoms have improved.                                                                         

                                                                                                  

                                                                                                  

                                                                                                  

Historical:                                                                                       

- Allergies: No known drug Allergies;                                                             

- Home Meds:                                                                                      

1. lisinopril 10 mg Oral tab 1 tab once daily                                                   

     (Last dose: 2017)                                                                      

2. atenolol 25 mg Oral tab 1 tab once daily                                                     

     (Last dose: 2017)                                                                      

- PMHx: Hypertension;                                                                             

- PSHx: none;                                                                                     

- Social history: Smoking status: Patient states was never smoker of tobacco. No                  

barriers to communication noted, The patient speaks fluent English, Speaks                      

appropriately for age.                                                                          

- Family history: Not pertinent.                                                                  

- : The pt / caregiver states he / she is not on anticoagulants. Home medication list             

is obtained from the patient.                                                                   

- Exposure Risk Screening:: None identified.                                                      

                                                                                                  

                                                                                                  

Vital Signs:                                                                                      

01/10                                                                                             

14:45  / 114; Pulse 120; Resp 18 S; Temp 97.3(O); Pulse Ox 95% on R/A; Weight 81.65 kg  dd6 

      / 180.01 lbs (R); Height 5 ft. 4 in. (162.56 cm) (R);                                       

16:00 Pain 4/10;                                                                              mcp 

16:26  / 98; Pulse 98; Resp 18; Temp 98.9; Pulse Ox 98% ; Pain 5/10;                    jam1

14:45 Body Mass Index 30.90 (81.65 kg, 162.56 cm)                                             dd6 

                                                                                                  

MDM:                                                                                              

15:02 IV Saline Lock ordered.                                                                 ar2 

15:02 Ampicillin-Sulbactam Sodium 3 grams IVPB once over 30 mins; dilute in 100mL of NS or    ar2 

      D5W ordered.                                                                                

15:02 NS 0.9% 1000 ml IV at bolus once ordered.                                               ar2 

15:02 Lisinopril 10 mg PO once ordered.                                                       ar2 

15:02 Atenolol 25 mg PO once ordered.                                                         ar2 

15:04 CBC with Diff Ordered.                                                                  EDMS

15:04 MED Profile Ordered.                                                                    EDMS

15:04 Sed Rate Ordered.                                                                       EDMS

15:04 CRP Ordered.                                                                            EDMS

15:06 morphine 4 mg IVP once ordered.                                                         ar2 

15:29 Ondansetron 4 mg IVP once ordered.                                                      ar2 

15:42 Financial registration complete.                                                        zo  

15:45 [facility] Payment Agreement was scanned into [employer] and attached to record.               zo  

15:48 CBC with Diff Reviewed.                                                                 ar2 

15:48 MED Profile Reviewed.                                                                   ar2 

15:48 CRP Reviewed.                                                                           ar2 

15:49 Vital Signs ordered.                                                                    ar2 

15:49 CT Neck With Contrast Ordered.                                                          EDMS

18:19 HYDROcodone-acetaminophen 4 pack- 5 mg-325 mg 1 packets PO Per package directions;      ar2 

      Dispense with patient. 1 po q4h prn for pain ordered.                                       

                                                                                                  

Administered Medications:                                                                         

15:31 Drug: Ampicillin-Sulbactam Sodium 3 grams [ampicillin-sulbactam 1.5 gram solution for   jmb 

      injection] Route: IVPB; Infused Over: 30 mins; Site: right antecubital;                     

16:30 Follow up: IV Status: Completed infusion; IV Intake: 100ml                              mcp 

15:31 Drug: NS 0.9% 1000 ml [sodium chloride 0.9 % intravenous solution] Route: IV; Rate:     jmb 

      bolus; Site: right antecubital;                                                             

16:39 Follow up: IV Status: Infusion discontinued; IV Intake: 900ml                           mcp 

15:31 Drug: Lisinopril 10 mg [lisinopril 10 mg tablet (1 tabs)] Route: PO;                    [facility] 

15:31 Drug: Atenolol 25 mg [atenolol 25 mg tablet (1 tabs)] Route: PO;                        [facility] 

15:31 Drug: morphine 4 mg [morphine 4 mg/mL intravenous cartridge (1 mL)] Route: IVP; Site:   [facility] 

      right antecubital;                                                                          

16:00 Follow up: Pain 4/10 Adult; Response: Pain is decreased                                 [facility] 

15:31 Drug: Ondansetron 4 mg [ondansetron HCl 2 mg/mL intravenous solution (2 mL)] Route:     [facility] 

      IVP; Site: right antecubital;                                                               

18:21 Drug: HYDROcodone-acetaminophen 4 pack- 1 packets [hydrocodone 5 mg-acetaminophen 325   ms18

      mg tablet (1 tabs)] {Co-Signature: [id number] (Faizan Caceres RN).} Route: PO;                      

                                                                                                  

                                                                                                  

Signatures:                                                                                       

Dispatcher [employer]                           Yamini Dalal RN RN   [facility]                                                  

Yon Hernandez Aaron, PA-C                 [facility] ar2                                                  

Lora Mcdowell RN                    RN   hs1                                                  

Sondra Garibay RN                        RN   [id number]                                                 

Mikel Bennett RN   b                                                  

Faizan isaac                                                  

                                                                                                  

The chart was reviewed and I authenticate all verbal orders and agree with the evaluation and 
treatment provided.Attachments:

15:45 [facility] Payment Agreement                                                                zo  

                                                                                                  

**************************************************************************************************

MTDD

## 2017-01-12 NOTE — EDDOCDS
Physician Documentation                                                                           

[facility]                                                                         

Name: Madyson Snider                                                                                 

Age: 54 yrs                                                                                       

Sex: Female                                                                                       

: 1962                                                                                   

MRN: [id number]                                                                                     

Arrival Date: 01/10/2017                                                                          

Time: 14:44                                                                                       

Account#: [id number]                                                                              

Bed PR                                                                                      

Private MD: No Pcp                                                                                

Disposition:                                                                                      

01/10/17 16:26 Discharged to Home/Self Care. Impression: Periapical abscess without sinus -       

phlegmon left mandible, widespread dental caries.                                               

- Condition is Stable.                                                                            

- Discharge Instructions: Dental Abscess, Dental Pain.                                            

- Prescriptions for Augmentin 875- 125 mg Oral Tablet - take 1 tablet by ORAL route               

every 12 hours for 10 days; 20 tablet. Ibuprofen 800 mg Oral Tablet - take 1 tablet             

by ORAL route every 8 hours As needed take with food; 30 tablet. Tylenol- Codeine #3            

300-30 mg Oral Tablet - take 2 tablet by ORAL route every 6 hours As needed MDD: 4              

tabs; 30 tablet. ZOFRAN ODT 4 mg - dissolve 1 tablet by ORAL route 4 times per day As           

needed do not chew, do not swallow whole; 10 tablet.                                            

- Medication Reconciliation, Local Pharmacy Hours form.                                           

- Follow up: Eric Buck MD; When: Call to arrange an appointment; Reason: Recheck              

today's complaints. Follow up: Emergency Department; When: As needed; Reason:                   

Worsening of conditions.                                                                        

- Problem is new.                                                                                 

- Symptoms have improved.                                                                         

                                                                                                  

                                                                                                  

                                                                                                  

Historical:                                                                                       

- Allergies: No known drug Allergies;                                                             

- Home Meds:                                                                                      

1. lisinopril 10 mg Oral tab 1 tab once daily                                                   

     (Last dose: 2017)                                                                      

2. atenolol 25 mg Oral tab 1 tab once daily                                                     

     (Last dose: 2017)                                                                      

- PMHx: Hypertension;                                                                             

- PSHx: none;                                                                                     

- Social history: Smoking status: Patient states was never smoker of tobacco. No                  

barriers to communication noted, The patient speaks fluent English, Speaks                      

appropriately for age.                                                                          

- Family history: Not pertinent.                                                                  

- : The pt / caregiver states he / she is not on anticoagulants. Home medication list             

is obtained from the patient.                                                                   

- Exposure Risk Screening:: None identified.                                                      

                                                                                                  

                                                                                                  

Vital Signs:                                                                                      

01/10                                                                                             

14:45  / 114; Pulse 120; Resp 18 S; Temp 97.3(O); Pulse Ox 95% on R/A; Weight 81.65 kg  dd6 

      / 180.01 lbs (R); Height 5 ft. 4 in. (162.56 cm) (R);                                       

16:00 Pain 4/10;                                                                              mcp 

16:26  / 98; Pulse 98; Resp 18; Temp 98.9; Pulse Ox 98% ; Pain 5/10;                    jam1

14:45 Body Mass Index 30.90 (81.65 kg, 162.56 cm)                                             dd6 

                                                                                                  

MDM:                                                                                              

15:02 IV Saline Lock ordered.                                                                 ar2 

15:02 Ampicillin-Sulbactam Sodium 3 grams IVPB once over 30 mins; dilute in 100mL of NS or    ar2 

      D5W ordered.                                                                                

15:02 NS 0.9% 1000 ml IV at bolus once ordered.                                               ar2 

15:02 Lisinopril 10 mg PO once ordered.                                                       ar2 

15:02 Atenolol 25 mg PO once ordered.                                                         ar2 

15:04 CBC with Diff Ordered.                                                                  EDMS

15:04 MED Profile Ordered.                                                                    EDMS

15:04 Sed Rate Ordered.                                                                       EDMS

15:04 CRP Ordered.                                                                            EDMS

15:06 morphine 4 mg IVP once ordered.                                                         ar2 

15:29 Ondansetron 4 mg IVP once ordered.                                                      ar2 

15:42 Financial registration complete.                                                        zo  

15:45 [facility] Payment Agreement was scanned into [employer] and attached to record.               zo  

15:48 CBC with Diff Reviewed.                                                                 ar2 

15:48 MED Profile Reviewed.                                                                   ar2 

15:48 CRP Reviewed.                                                                           ar2 

15:49 Vital Signs ordered.                                                                    ar2 

15:49 CT Neck With Contrast Ordered.                                                          EDMS

18:19 HYDROcodone-acetaminophen 4 pack- 5 mg-325 mg 1 packets PO Per package directions;      ar2 

      Dispense with patient. 1 po q4h prn for pain ordered.                                       

                                                                                             

10:56 T-Sheet-- Draft Copy was scanned into [employer] and attached to record.                   gb  

10:56 Radiology Report was scanned into [employer] and attached to record.                       gb  

                                                                                                  

Administered Medications:                                                                         

01/10                                                                                             

15:31 Drug: Ampicillin-Sulbactam Sodium 3 grams [ampicillin-sulbactam 1.5 gram solution for   jmb 

      injection] Route: IVPB; Infused Over: 30 mins; Site: right antecubital;                     

16:30 Follow up: IV Status: Completed infusion; IV Intake: 100ml                              mcp 

15:31 Drug: NS 0.9% 1000 ml [sodium chloride 0.9 % intravenous solution] Route: IV; Rate:     jmb 

      bolus; Site: right antecubital;                                                             

16:39 Follow up: IV Status: Infusion discontinued; IV Intake: 900ml                           [facility] 

15:31 Drug: Lisinopril 10 mg [lisinopril 10 mg tablet (1 tabs)] Route: PO;                    [facility] 

15:31 Drug: Atenolol 25 mg [atenolol 25 mg tablet (1 tabs)] Route: PO;                        [facility] 

15:31 Drug: morphine 4 mg [morphine 4 mg/mL intravenous cartridge (1 mL)] Route: IVP; Site:   [facility] 

      right antecubital;                                                                          

16:00 Follow up: Pain 4/10 Adult; Response: Pain is decreased                                 [facility] 

15:31 Drug: Ondansetron 4 mg [ondansetron HCl 2 mg/mL intravenous solution (2 mL)] Route:     b 

      IVP; Site: right antecubital;                                                               

18:21 Drug: HYDROcodone-acetaminophen 4 pack- 1 packets [hydrocodone 5 mg-acetaminophen 325   ms18

      mg tablet (1 tabs)] {Co-Signature: dwg (Faizan Caceres RN).} Route: PO;                      

                                                                                                  

                                                                                                  

Signatures:                                                                                       

Dispatcher [employer]                           Yamini Dalal RN                        RN   [facility]                                                  

Ambika Pereyra, Reg                  Reg  gb                                                   

Yon Hernandez Aaron, PA-C                 [facility] ar2                                                  

Lora Mcdowell RN                    RN   hs1                                                  

Sondra Garibay RN                        RN   [id number]                                                 

Mikel Bennett RN, RN                                                  

                                                                                                  

The chart was reviewed and I authenticate all verbal orders and agree with the evaluation and 
treatment provided.Attachments:

15:45 [facility] Payment Agreement                                                                zo  

                                                                                             

10:56 T-Sheet-- Draft Copy                                                                    gb  

                                                                                                  

**************************************************************************************************



*** Chart Complete ***
MTDD

## 2017-01-12 NOTE — EDDOCDS
Nurse's Notes                                                                                     

[facility]                                                                         

Name: Madyson Snider                                                                                 

Age: 54 yrs                                                                                       

Sex: Female                                                                                       

: 1962                                                                                   

MRN: [id number]                                                                                     

Arrival Date: 01/10/2017                                                                          

Time: 14:44                                                                                       

Account#: [id number]                                                                              

Bed PR                                                                                      

Private MD: No Pcp                                                                                

Diagnosis: Periapical abscess without sinus-phlegmon left mandible, widespread dental caries      

                                                                                                  

Presentation:                                                                                     

01/10                                                                                             

14:48 Presenting complaint: Patient states: dental infection and has been ongoing for 2       hs1 

      weeks. Pt at [facility] for teeth removal and was sent here because        

      they stated her blood pressure was too high. Patient states she was also sent here for      

      antibiotics. Adult Sepsis Screening: The patient does not have new or worsening altered     

      mentation. Patient's respiratory rate is less than 22. Systolic blood pressure is           

      greater than 100. Patient has a qSOFA score of 0- Negative Sepsis Screen.                   

      Suicide/Homicide risk assessment- the patient denies having any suicidal and/or             

      homicidal ideations and does not present with any other emotional, behavioral or mental     

      health complaints.  Status: Patient is not a  or              

      dependent. Transition of care: patient was received from [facility] .                                                                      

14:48 Acuity: PETER Level 3                                                                     hs1 

14:48 Method Of Arrival: Walkin/Carried/Asstd                                                 hs1 

                                                                                                  

Triage Assessment:                                                                                

14:52 General: Appears in no apparent distress, Behavior is appropriate for age, cooperative. hs1 

      Pain: Location: mouth Pain currently is 10 out of 10 on a pain scale. Quality of pain       

      is described as throbbing. Respiratory: Airway is patent Respiratory effort is even,        

      unlabored.                                                                                  

14:53 Pt Declines HIV testing.                                                                hs1 

                                                                                                  

Historical:                                                                                       

- Allergies: No known drug Allergies;                                                             

- Home Meds:                                                                                      

1. lisinopril 10 mg Oral tab 1 tab once daily                                                   

     (Last dose: 2017)                                                                      

2. atenolol 25 mg Oral tab 1 tab once daily                                                     

     (Last dose: 2017)                                                                      

- PMHx: Hypertension;                                                                             

- PSHx: none;                                                                                     

- Social history: Smoking status: Patient states was never smoker of tobacco. No                  

barriers to communication noted, The patient speaks fluent English, Speaks                      

appropriately for age.                                                                          

- Family history: Not pertinent.                                                                  

- : The pt / caregiver states he / she is not on anticoagulants. Home medication list             

is obtained from the patient.                                                                   

- Exposure Risk Screening:: None identified.                                                      

                                                                                                  

                                                                                                  

Screenin:54 Screening information is obtained from the patient. Fall risk: No risks identified.     hs1 

      Assistance ADL's: requires no assistance with activities of daily living. Abuse/DV          

      Screen: The patient / caregiver reports he/she is: not in a situation that causes fear,     

      pain or injury. Nutritional screening: No deficits noted. Advance Directives: There is      

      no active DNR order. home support is adequate.                                              

                                                                                                  

Assessment:                                                                                       

15:32 General: Appears in no apparent distress, comfortable, Behavior is appropriate for age, jmb 

      cooperative. Pain: Location: mouth Pain currently is 10 out of 10 on a pain scale.          

      Neurological: Level of Consciousness is awake, alert, obeys commands, Oriented to           

      person, place, time,  are equal bilaterally Speech is normal, Facial symmetry          

      appears normal. Cardiovascular: Capillary refill < 3 seconds Heart tones present Pulses     

      are all present. Respiratory: Airway is patent Respiratory effort is even, unlabored,       

      Respiratory pattern is regular, symmetrical, Breath sounds are clear bilaterally. GI:       

      Abdomen is non- distended Bowel sounds present X 4 quads. Abd is soft X 4 quads. Derm:      

      Skin is normal. Musculoskeletal: Range of motion intact in all extremities.                 

16:20 General: Appears in no apparent distress, Behavior is cooperative. Neurological: No     mcp 

      deficits noted. Respiratory: Airway is patent Respiratory effort is even, unlabored.        

      Derm: Skin is pink, warm & dry.                                                           

                                                                                                  

Vital Signs:                                                                                      

14:45  / 114; Pulse 120; Resp 18 S; Temp 97.3(O); Pulse Ox 95% on R/A; Weight 81.65 kg  dd6 

      (R); Height 5 ft. 4 in. (162.56 cm) (R);                                                    

16:00 Pain 4/10;                                                                              mcp 

16:26  / 98; Pulse 98; Resp 18; Temp 98.9; Pulse Ox 98% ; Pain 5/10;                    jam1

14:45 Body Mass Index 30.90 (81.65 kg, 162.56 cm)                                             dd6 

                                                                                                  

Vitals:                                                                                           

14:45 Log In Time: January 10, 2017 at 14:43.                                                 dd6 

                                                                                                  

ED Course:                                                                                        

14:45 Patient visited by Tristan Ga PCA.                                             dd6 

14:45 No Pcp is Private Physician.                                                            dd6 

14:45 Patient moved to Waiting                                                                dd6 

14:46 Patient moved to Pre RCE                                                                dd6 

14:50 Triage Initiated                                                                        hs1 

14:54 Patient moved to Triage 3                                                               hs1 

14:55 Brenton Singh PA-C is PHCP.                                                        ar2 

14:55 Katya Figueroa MD is Attending Physician.                                      ar2 

14:55 Patient visited by Brenton Singh PA-C.                                             ar2 

15:04 Patient moved to I2 / M2                                                                ms18

15:16 CBC with Diff Sent.                                                                     jmb 

15:16 MED Profile Sent.                                                                       jmb 

15:16 Sed Rate Sent.                                                                          jmb 

15:16 CRP Sent.                                                                               jmb 

15:32 The patient / caregiver is instructed regarding the plan of care and ED course.         jmb 

15:32 Inserted saline lock: 20 gauge in right antecubital area and blood collected. The       jmb 

      patient tolerated the procedure well. Labs drawn. (by ED staff). Sent per order to lab.     

15:33 Patient visited by Mikel Bennett RN.                                                    jmb 

15:45 [facility] Payment Agreement was scanned into [employer] and attached to record.               zo  

16:25 Eric Buck MD is Referral Physician.                                                 ar2 

16:42 Discontinued lock intact, bleeding controlled, pressure dressing applied, No            mcp 

      redness/swelling at site. No procedures done that require assistance.                       

18:07 Patient moved to PR1 / 25                                                               ms18

18:19 CT Neck With Contrast Returned.                                                         EDMS

                                                                                             

10:56 T-Sheet-- Draft Copy was scanned into [employer] and attached to record.                   gb  

10:56 Radiology Report was scanned into [employer] and attached to record.                       gb  

                                                                                                  

Administered Medications:                                                                         

01/10                                                                                             

15:31 Drug: Ampicillin-Sulbactam Sodium 3 grams [ampicillin-sulbactam 1.5 gram solution for   jmb 

      injection] Route: IVPB; Infused Over: 30 mins; Site: right antecubital;                     

16:30 Follow up: IV Status: Completed infusion; IV Intake: 100ml                              mcp 

15:31 Drug: NS 0.9% 1000 ml [sodium chloride 0.9 % intravenous solution] Route: IV; Rate:     jmb 

      bolus; Site: right antecubital;                                                             

16:39 Follow up: IV Status: Infusion discontinued; IV Intake: 900ml                           mcp 

15:31 Drug: Lisinopril 10 mg [lisinopril 10 mg tablet (1 tabs)] Route: PO;                    jmb 

15:31 Drug: Atenolol 25 mg [atenolol 25 mg tablet (1 tabs)] Route: PO;                        [facility] 

15:31 Drug: morphine 4 mg [morphine 4 mg/mL intravenous cartridge (1 mL)] Route: IVP; Site:   [facility] 

      right antecubital;                                                                          

16:00 Follow up: Pain /10 Adult; Response: Pain is decreased                                 [facility] 

15:31 Drug: Ondansetron 4 mg [ondansetron HCl 2 mg/mL intravenous solution (2 mL)] Route:     jmb 

      IVP; Site: right antecubital;                                                               

18:21 Drug: HYDROcodone-acetaminophen 4 pack- 1 packets [hydrocodone 5 mg-acetaminophen 325   ms18

      mg tablet (1 tabs)] {Co-Signature: dwlibby (Faizan Caceres RN).} Route: PO;                      

                                                                                                  

                                                                                                  

Intake:                                                                                           

16:30 IV: 100.00ml; Total: 100.00ml.                                                          mcp 

16:39 IV: 900.00ml; Total: 1000.00ml.                                                         mcp 

                                                                                                  

Order Results:                                                                                    

Lab Order: CBC with Diff; SPEC'M 01/10/17 15:14                                                   

      Test: WHITE BLOOD COUNT; Value: 11.1; Range: 4.0-10.0; Abnormal: Above high normal;         

      Units: K/mm3; Status: F                                                                     

      Test: RED BLOOD COUNT; Value: 4.51; Range: 4.00-5.40; Units: M/mm3; Status: F               

      Test: HEMOGLOBIN; Value: 14.5; Range: 12.0-16.0; Units: g/dl; Status: F                     

      Test: HEMATOCRIT; Value: 42.9; Range: 36.0-47.0; Units: %; Status: F                        

      Test: MEAN CORPUSCULAR VOLUME; Value: 95.1; Range: 80.0-96.0; Units: fl; Status: F          

      Test: MEAN CORPUSCULAR HEMOGLOBIN; Value: 32.2; Range: 27.0-33.0; Units: pg; Status: F      

      Test: MEAN CORPUSCULAR HGB CONC; Value: 33.9; Range: 32.0-36.5; Units: g/dl; Status: F      

      Test: RED CELL DISTRIBUTION WIDTH; Value: 12.9; Range: 11.5-14.5; Units: %; Status: F       

      Test: PLATELET COUNT, AUTOMATED; Value: 317; Range: 150-450; Units: k/mm3; Status: F        

      Test: NEUTROPHILS %; Value: 83.8; Range: 36.0-66.0; Abnormal: Above high normal; Units:     

      %; Status: F                                                                                

      Test: LYMPH %; Value: 12.1; Range: 24.0-44.0; Abnormal: Below low normal; Units: %;         

      Status: F                                                                                   

      Test: MONO %; Value: 1.5; Range: 0.0-5.0; Units: %; Status: F                               

      Test: EOS %; Value: 1.4; Range: 0.0-3.0; Units: %; Status: F                                

      Test: BASO %; Value: 0.2; Range: 0.0-1.0; Units: %; Status: F                               

      Test: LARGE UNSTAINED CELL %; Value: 0.9; Range: 0.0-4.0; Units: %; Status: F               

      Test: NEUTROPHILS #; Value: 9.3; Range: 1.8-7.7; Abnormal: Above high normal; Units:        

      K/mm3; Status: F                                                                            

      Test: LYMPH #; Value: 1.3; Range: 1.5-4.5; Abnormal: Below low normal; Units: K/mm3;        

      Status: F                                                                                   

      Test: MONO #; Value: 0.2; Range: 0.0-0.8; Units: K/mm3; Status: F                           

      Test: EOS #; Value: 0.2; Range: 0.0-0.50; Units: K/mm3; Status: F                           

      Test: BASO #; Value: 0.0; Range: 0.0-0.2; Units: K/mm3; Status: F                           

      Test: LARGE UNSTAINED CELL #; Value: 0.1; Range: 0.0-0.4; Units: K/mm3; Status: F           

Lab Order: MED Profile; SPEC'M 01/10/17 15:14                                                     

      Test: GLUCOSE, FASTING; Value: 125; Range: ; Abnormal: Above high normal; Units:      

      MG/DL; Status: F                                                                            

      Test: BLOOD UREA NITROGEN; Value: 8; Range: 7-18; Units: MG/DL; Status: F                   

      Test: CREATININE FOR GFR; Value: 0.84; Range: 0.55-1.02; Units: MG/DL; Status: F            

      Test: GLOMERULAR FILTRATION RATE; Value: > 60.0; Range: >51; Status: F                      

      Test: SODIUM LEVEL; Value: 139; Range: 136-145; Units: MEQ/L; Status: F                     

      Test: POTASSIUM SERUM; Value: 3.5; Range: 3.5-5.1; Units: MEQ/L; Status: F                  

      Test: CHLORIDE LEVEL; Value: 99; Range: ; Units: MEQ/L; Status: F                     

      Test: CARBON DIOXIDE LEVEL; Value: 27; Range: 21-32; Units: MEQ/L; Status: F                

      Test: ANION GAP; Value: 13; Range: 8-16; Units: MEQ/L; Status: F                            

      Test: CALCIUM LEVEL; Value: 9.9; Range: 8.5-10.1; Units: MG/DL; Status: F                   

      Test Note: &nbsp;; Units are mL/min/1.73 m2 Chronic Kidney Disease Staging per NKF:       

      Stage I & II GFR >=60 Normal to Mildly Decreased Stage III GFR 30-59 Moderately           

      Decreased Stage IV GFR 15-29 Severely Decreased Stage V GFR <15 Very Little GFR Left        

      ESRD GFR <15 on RRT                                                                         

Lab Order: Sed Rate; SPEC'M 01/10/17 15:14                                                        

      Test: ERYTHROCYTE SEDIMENTATION RATE; Value: 26; Range: 0-30; Units: mm/hr; Status: F       

Lab Order: CRP; SPEC'M 01/10/17 15:14                                                             

      Test: C REACTIVE PROTEIN QUANTITATIV; Value: 1.44; Range: 0.00-0.30; Abnormal: Above        

      high normal; Units: MG/DL; Status: F                                                        

                                                                                                  

Radiology Order: CT Neck With Contrast                                                            

      Test: CT Neck With Contrast                                                                 

      REASON FOR EXAMINATION: r/o abscess left mandibular; CT NECK WITH CONTRAST:; ; HISTORY:     

      Rule out abscess.; ; There is soft-tissue thickening along the buccal surface of the        

      body and of the; left mandible and symphysis . This represents a phlegmon. There is         

      thickening of; the left platysmal muscle and stranding in the overlying subcutaneous        

      tissue; consistent with edema. The naso-, fatimah,- and hypopharynx, larynx and subglottic;     

      trachea are normal in appearance. The salivary and thyroid glands are normal.; Small        

      lymph nodes less than 1 cm in size are present in the internal jugular; chains,             

      posterior triangles, submandibular and submental areas. Minimal; degenerative change is     

      present in the cervical spine. The lung apices are clear.; A punctate calcification is      

      present in the sella turcica. Periapical lucency is; present involving the second molar     

      tooth of the left mandible. There are; possible dental caries involving the first           

      premolar and left second molar teeth; of the left mandible and second premolar tooth of     

      the right mandible.; ; IMPRESSION:; ; There is soft tissue thickening along the buccal      

      surface of the body of the left; mandible and mandibular symphysis consistent with a        

      phlegmon.; ; ; Signed by; Emeka James MD 2017 08:45 A;                              

Outcome:                                                                                          

16:26 Discharge ordered by Provider.                                                          ar2 

16:43 Discharge Assessment: patient administered narcotics - yes. Pt provided with safe       [facility] 

      discharge. The following High Risk Discharge criteria are identified: None. Discharged      

      to home ambulatory. Condition: stable. Discharge instructions given to patient,             

      Instructed on discharge instructions, follow up and referral plans. medication usage,       

      Demonstrated understanding of instructions, medications, Pt was receptive of discharge      

      instructions/ teaching. Prescriptions given X 4. CT Study completed. Property sent home     

      with patient.                                                                               

16:44 Patient left the ED.                                                                    [facility] 

18:23 Patient left the ED.                                                                    ms18

                                                                                                  

Signatures:                                                                                       

Dispatcher [employer]                           EDMS                                                 

Yamini Kruger, RN                        RN   Shazia Cornell, PCA                       PCA  jam1                                                 

Ambika Pereyra, Steve                  Reg  Yon Welch Aaron, PA-C                 PA-C ar2                                                  

Tristan Ga, PCA                 PCA  dd6                                                  

Lora Mcdowell RN                    RN   hs1                                                  

Mikel Bennett RN                        RN   Sondra Daugherty RN                        RN   ms18                                                 

Faizan isaac                                                  

                                                                                                  

**************************************************************************************************



*** Chart Complete ***
MTDD

## 2017-01-12 NOTE — EDDOCDS
Physician Documentation                                                                           

[facility]                                                                         

Name: Madyson Snider                                                                                 

Age: 54 yrs                                                                                       

Sex: Female                                                                                       

: 1962                                                                                   

MRN: [id number]                                                                                     

Arrival Date: 01/10/2017                                                                          

Time: 14:44                                                                                       

Account#: [id number]                                                                              

Bed PR                                                                                      

Private MD: No Pcp                                                                                

Disposition:                                                                                      

01/10/17 16:26 Discharged to Home/Self Care. Impression: Periapical abscess without sinus -       

phlegmon left mandible, widespread dental caries.                                               

- Condition is Stable.                                                                            

- Discharge Instructions: Dental Abscess, Dental Pain.                                            

- Prescriptions for Augmentin 875- 125 mg Oral Tablet - take 1 tablet by ORAL route               

every 12 hours for 10 days; 20 tablet. Ibuprofen 800 mg Oral Tablet - take 1 tablet             

by ORAL route every 8 hours As needed take with food; 30 tablet. Tylenol- Codeine #3            

300-30 mg Oral Tablet - take 2 tablet by ORAL route every 6 hours As needed MDD: 4              

tabs; 30 tablet. ZOFRAN ODT 4 mg - dissolve 1 tablet by ORAL route 4 times per day As           

needed do not chew, do not swallow whole; 10 tablet.                                            

- Medication Reconciliation, Local Pharmacy Hours form.                                           

- Follow up: Eric Buck MD; When: Call to arrange an appointment; Reason: Recheck              

today's complaints. Follow up: Emergency Department; When: As needed; Reason:                   

Worsening of conditions.                                                                        

- Problem is new.                                                                                 

- Symptoms have improved.                                                                         

                                                                                                  

                                                                                                  

                                                                                                  

Historical:                                                                                       

- Allergies: No known drug Allergies;                                                             

- Home Meds:                                                                                      

1. lisinopril 10 mg Oral tab 1 tab once daily                                                   

     (Last dose: 2017)                                                                      

2. atenolol 25 mg Oral tab 1 tab once daily                                                     

     (Last dose: 2017)                                                                      

- PMHx: Hypertension;                                                                             

- PSHx: none;                                                                                     

- Social history: Smoking status: Patient states was never smoker of tobacco. No                  

barriers to communication noted, The patient speaks fluent English, Speaks                      

appropriately for age.                                                                          

- Family history: Not pertinent.                                                                  

- : The pt / caregiver states he / she is not on anticoagulants. Home medication list             

is obtained from the patient.                                                                   

- Exposure Risk Screening:: None identified.                                                      

                                                                                                  

                                                                                                  

Vital Signs:                                                                                      

01/10                                                                                             

14:45  / 114; Pulse 120; Resp 18 S; Temp 97.3(O); Pulse Ox 95% on R/A; Weight 81.65 kg  dd6 

      / 180.01 lbs (R); Height 5 ft. 4 in. (162.56 cm) (R);                                       

16:00 Pain 4/10;                                                                              mcp 

16:26  / 98; Pulse 98; Resp 18; Temp 98.9; Pulse Ox 98% ; Pain 5/10;                    jam1

14:45 Body Mass Index 30.90 (81.65 kg, 162.56 cm)                                             dd6 

                                                                                                  

MDM:                                                                                              

15:02 IV Saline Lock ordered.                                                                 ar2 

15:02 Ampicillin-Sulbactam Sodium 3 grams IVPB once over 30 mins; dilute in 100mL of NS or    ar2 

      D5W ordered.                                                                                

15:02 NS 0.9% 1000 ml IV at bolus once ordered.                                               ar2 

15:02 Lisinopril 10 mg PO once ordered.                                                       ar2 

15:02 Atenolol 25 mg PO once ordered.                                                         ar2 

15:04 CBC with Diff Ordered.                                                                  EDMS

15:04 MED Profile Ordered.                                                                    EDMS

15:04 Sed Rate Ordered.                                                                       EDMS

15:04 CRP Ordered.                                                                            EDMS

15:06 morphine 4 mg IVP once ordered.                                                         ar2 

15:29 Ondansetron 4 mg IVP once ordered.                                                      ar2 

15:42 Financial registration complete.                                                        zo  

15:45 [facility] Payment Agreement was scanned into [employer] and attached to record.               zo  

15:48 CBC with Diff Reviewed.                                                                 ar2 

15:48 MED Profile Reviewed.                                                                   ar2 

15:48 CRP Reviewed.                                                                           ar2 

15:49 Vital Signs ordered.                                                                    ar2 

15:49 CT Neck With Contrast Ordered.                                                          EDMS

18:19 HYDROcodone-acetaminophen 4 pack- 5 mg-325 mg 1 packets PO Per package directions;      ar2 

      Dispense with patient. 1 po q4h prn for pain ordered.                                       

                                                                                             

10:56 T-Sheet-- Draft Copy was scanned into [employer] and attached to record.                   gb  

10:56 Radiology Report was scanned into [employer] and attached to record.                       gb  

                                                                                                  

Administered Medications:                                                                         

01/10                                                                                             

15:31 Drug: Ampicillin-Sulbactam Sodium 3 grams [ampicillin-sulbactam 1.5 gram solution for   jmb 

      injection] Route: IVPB; Infused Over: 30 mins; Site: right antecubital;                     

16:30 Follow up: IV Status: Completed infusion; IV Intake: 100ml                              mcp 

15:31 Drug: NS 0.9% 1000 ml [sodium chloride 0.9 % intravenous solution] Route: IV; Rate:     jmb 

      bolus; Site: right antecubital;                                                             

16:39 Follow up: IV Status: Infusion discontinued; IV Intake: 900ml                           [facility] 

15:31 Drug: Lisinopril 10 mg [lisinopril 10 mg tablet (1 tabs)] Route: PO;                    [facility] 

15:31 Drug: Atenolol 25 mg [atenolol 25 mg tablet (1 tabs)] Route: PO;                        [facility] 

15:31 Drug: morphine 4 mg [morphine 4 mg/mL intravenous cartridge (1 mL)] Route: IVP; Site:   [facility] 

      right antecubital;                                                                          

16:00 Follow up: Pain 4/10 Adult; Response: Pain is decreased                                 [facility] 

15:31 Drug: Ondansetron 4 mg [ondansetron HCl 2 mg/mL intravenous solution (2 mL)] Route:     b 

      IVP; Site: right antecubital;                                                               

18:21 Drug: HYDROcodone-acetaminophen 4 pack- 1 packets [hydrocodone 5 mg-acetaminophen 325   ms18

      mg tablet (1 tabs)] {Co-Signature: dwg (Faizan Caceres RN).} Route: PO;                      

                                                                                                  

                                                                                                  

Signatures:                                                                                       

Dispatcher [employer]                           Yamini Dalal RN                        RN   [facility]                                                  

Ambika Pereyra, Reg                  Reg  gb                                                   

Yon Hernandez Aaron, PA-C                 [facility] ar2                                                  

Lora Mcdowell RN                    RN   hs1                                                  

Sondra Garibay RN                        RN   [id number]                                                 

Mikel Bennett RN, RN                                                  

                                                                                                  

The chart was reviewed and I authenticate all verbal orders and agree with the evaluation and 
treatment provided.Attachments:

15:45 [facility] Payment Agreement                                                                zo  

                                                                                             

10:56 T-Sheet-- Draft Copy                                                                    gb  

                                                                                                  

**************************************************************************************************



*** Chart Complete ***
MTDD